# Patient Record
Sex: FEMALE | NOT HISPANIC OR LATINO | URBAN - METROPOLITAN AREA
[De-identification: names, ages, dates, MRNs, and addresses within clinical notes are randomized per-mention and may not be internally consistent; named-entity substitution may affect disease eponyms.]

---

## 2018-07-06 RX ORDER — TRETINOIN 0.25 MG/G
CREAM TOPICAL AT BEDTIME
COMMUNITY

## 2018-07-06 RX ORDER — CLINDAMYCIN PHOSPHATE 11.9 MG/ML
SOLUTION TOPICAL 2 TIMES DAILY
COMMUNITY

## 2018-07-06 RX ORDER — MULTIPLE VITAMINS W/ MINERALS TAB 9MG-400MCG
1 TAB ORAL DAILY
COMMUNITY

## 2018-07-06 RX ORDER — CYCLOSPORINE 0.5 MG/ML
2 EMULSION OPHTHALMIC DAILY
COMMUNITY

## 2018-07-06 RX ORDER — CETIRIZINE HYDROCHLORIDE 10 MG/1
10 TABLET ORAL DAILY
COMMUNITY

## 2018-07-06 RX ORDER — BACLOFEN 10 MG/1
5-10 TABLET ORAL
COMMUNITY

## 2018-07-09 ENCOUNTER — HOSPITAL ENCOUNTER (OUTPATIENT)
Facility: CLINIC | Age: 53
Discharge: HOME | End: 2018-07-09
Attending: ORTHOPAEDIC SURGERY | Admitting: ORTHOPAEDIC SURGERY
Payer: COMMERCIAL

## 2018-07-09 ENCOUNTER — ANESTHESIA (OUTPATIENT)
Dept: SURGERY | Facility: CLINIC | Age: 53
End: 2018-07-09
Payer: COMMERCIAL

## 2018-07-09 ENCOUNTER — ANESTHESIA EVENT (OUTPATIENT)
Dept: SURGERY | Facility: CLINIC | Age: 53
End: 2018-07-09
Payer: COMMERCIAL

## 2018-07-09 VITALS
DIASTOLIC BLOOD PRESSURE: 76 MMHG | RESPIRATION RATE: 11 BRPM | HEIGHT: 67 IN | TEMPERATURE: 97.2 F | WEIGHT: 145 LBS | SYSTOLIC BLOOD PRESSURE: 114 MMHG | BODY MASS INDEX: 22.76 KG/M2 | OXYGEN SATURATION: 96 %

## 2018-07-09 DIAGNOSIS — Z98.890 S/P FOOT SURGERY, LEFT: Primary | ICD-10-CM

## 2018-07-09 LAB — HCG UR QL: NEGATIVE

## 2018-07-09 PROCEDURE — 25000128 H RX IP 250 OP 636: Performed by: ANESTHESIOLOGY

## 2018-07-09 PROCEDURE — 25000128 H RX IP 250 OP 636: Performed by: PHYSICIAN ASSISTANT

## 2018-07-09 PROCEDURE — 25000566 ZZH SEVOFLURANE, EA 15 MIN: Performed by: ORTHOPAEDIC SURGERY

## 2018-07-09 PROCEDURE — 81025 URINE PREGNANCY TEST: CPT | Performed by: ANESTHESIOLOGY

## 2018-07-09 PROCEDURE — 25000125 ZZHC RX 250: Performed by: NURSE ANESTHETIST, CERTIFIED REGISTERED

## 2018-07-09 PROCEDURE — 36000060 ZZH SURGERY LEVEL 3 W FLUORO 1ST 30 MIN: Performed by: ORTHOPAEDIC SURGERY

## 2018-07-09 PROCEDURE — 25000132 ZZH RX MED GY IP 250 OP 250 PS 637: Performed by: PHYSICIAN ASSISTANT

## 2018-07-09 PROCEDURE — C1763 CONN TISS, NON-HUMAN: HCPCS | Performed by: ORTHOPAEDIC SURGERY

## 2018-07-09 PROCEDURE — 27110028 ZZH OR GENERAL SUPPLY NON-STERILE: Performed by: ORTHOPAEDIC SURGERY

## 2018-07-09 PROCEDURE — 71000012 ZZH RECOVERY PHASE 1 LEVEL 1 FIRST HR: Performed by: ORTHOPAEDIC SURGERY

## 2018-07-09 PROCEDURE — 27210995 ZZH RX 272: Performed by: ORTHOPAEDIC SURGERY

## 2018-07-09 PROCEDURE — 36000058 ZZH SURGERY LEVEL 3 EA 15 ADDTL MIN: Performed by: ORTHOPAEDIC SURGERY

## 2018-07-09 PROCEDURE — 40000170 ZZH STATISTIC PRE-PROCEDURE ASSESSMENT II: Performed by: ORTHOPAEDIC SURGERY

## 2018-07-09 PROCEDURE — 25000128 H RX IP 250 OP 636: Performed by: ORTHOPAEDIC SURGERY

## 2018-07-09 PROCEDURE — 71000027 ZZH RECOVERY PHASE 2 EACH 15 MINS: Performed by: ORTHOPAEDIC SURGERY

## 2018-07-09 PROCEDURE — 37000009 ZZH ANESTHESIA TECHNICAL FEE, EACH ADDTL 15 MIN: Performed by: ORTHOPAEDIC SURGERY

## 2018-07-09 PROCEDURE — 37000008 ZZH ANESTHESIA TECHNICAL FEE, 1ST 30 MIN: Performed by: ORTHOPAEDIC SURGERY

## 2018-07-09 PROCEDURE — 71000013 ZZH RECOVERY PHASE 1 LEVEL 1 EA ADDTL HR: Performed by: ORTHOPAEDIC SURGERY

## 2018-07-09 PROCEDURE — 27210794 ZZH OR GENERAL SUPPLY STERILE: Performed by: ORTHOPAEDIC SURGERY

## 2018-07-09 PROCEDURE — 25000128 H RX IP 250 OP 636: Performed by: NURSE ANESTHETIST, CERTIFIED REGISTERED

## 2018-07-09 DEVICE — IMP WIRE KIRSCHNER 0.054X4" 78.2040: Type: IMPLANTABLE DEVICE | Site: FOOT | Status: FUNCTIONAL

## 2018-07-09 DEVICE — GRAFT TENDON SEMITENDINOSUS 26CM 430355: Type: IMPLANTABLE DEVICE | Site: FOOT | Status: FUNCTIONAL

## 2018-07-09 RX ORDER — CEFAZOLIN SODIUM 1 G/3ML
1 INJECTION, POWDER, FOR SOLUTION INTRAMUSCULAR; INTRAVENOUS SEE ADMIN INSTRUCTIONS
Status: DISCONTINUED | OUTPATIENT
Start: 2018-07-09 | End: 2018-07-09

## 2018-07-09 RX ORDER — CEFAZOLIN SODIUM 2 G/100ML
2 INJECTION, SOLUTION INTRAVENOUS
Status: COMPLETED | OUTPATIENT
Start: 2018-07-09 | End: 2018-07-09

## 2018-07-09 RX ORDER — AMOXICILLIN 250 MG
1-2 CAPSULE ORAL 2 TIMES DAILY
Qty: 30 TABLET | Refills: 0 | Status: SHIPPED | OUTPATIENT
Start: 2018-07-09

## 2018-07-09 RX ORDER — FENTANYL CITRATE 50 UG/ML
INJECTION, SOLUTION INTRAMUSCULAR; INTRAVENOUS PRN
Status: DISCONTINUED | OUTPATIENT
Start: 2018-07-09 | End: 2018-07-09

## 2018-07-09 RX ORDER — HYDROCODONE BITARTRATE AND ACETAMINOPHEN 5; 325 MG/1; MG/1
1-2 TABLET ORAL EVERY 4 HOURS PRN
Qty: 30 TABLET | Refills: 0 | Status: SHIPPED | OUTPATIENT
Start: 2018-07-09

## 2018-07-09 RX ORDER — FENTANYL CITRATE 50 UG/ML
25-50 INJECTION, SOLUTION INTRAMUSCULAR; INTRAVENOUS
Status: DISCONTINUED | OUTPATIENT
Start: 2018-07-09 | End: 2018-07-09 | Stop reason: HOSPADM

## 2018-07-09 RX ORDER — HYDRALAZINE HYDROCHLORIDE 20 MG/ML
2.5-5 INJECTION INTRAMUSCULAR; INTRAVENOUS EVERY 10 MIN PRN
Status: DISCONTINUED | OUTPATIENT
Start: 2018-07-09 | End: 2018-07-09 | Stop reason: HOSPADM

## 2018-07-09 RX ORDER — ONDANSETRON 4 MG/1
4 TABLET, ORALLY DISINTEGRATING ORAL
Status: DISCONTINUED | OUTPATIENT
Start: 2018-07-09 | End: 2018-07-09 | Stop reason: HOSPADM

## 2018-07-09 RX ORDER — ONDANSETRON 2 MG/ML
INJECTION INTRAMUSCULAR; INTRAVENOUS PRN
Status: DISCONTINUED | OUTPATIENT
Start: 2018-07-09 | End: 2018-07-09

## 2018-07-09 RX ORDER — ONDANSETRON 2 MG/ML
4 INJECTION INTRAMUSCULAR; INTRAVENOUS EVERY 30 MIN PRN
Status: DISCONTINUED | OUTPATIENT
Start: 2018-07-09 | End: 2018-07-09 | Stop reason: HOSPADM

## 2018-07-09 RX ORDER — HYDROMORPHONE HYDROCHLORIDE 1 MG/ML
.3-.5 INJECTION, SOLUTION INTRAMUSCULAR; INTRAVENOUS; SUBCUTANEOUS EVERY 10 MIN PRN
Status: DISCONTINUED | OUTPATIENT
Start: 2018-07-09 | End: 2018-07-09 | Stop reason: HOSPADM

## 2018-07-09 RX ORDER — MEPERIDINE HYDROCHLORIDE 25 MG/ML
12.5 INJECTION INTRAMUSCULAR; INTRAVENOUS; SUBCUTANEOUS
Status: DISCONTINUED | OUTPATIENT
Start: 2018-07-09 | End: 2018-07-09 | Stop reason: HOSPADM

## 2018-07-09 RX ORDER — LIDOCAINE HYDROCHLORIDE 20 MG/ML
INJECTION, SOLUTION INFILTRATION; PERINEURAL PRN
Status: DISCONTINUED | OUTPATIENT
Start: 2018-07-09 | End: 2018-07-09

## 2018-07-09 RX ORDER — SODIUM CHLORIDE, SODIUM LACTATE, POTASSIUM CHLORIDE, CALCIUM CHLORIDE 600; 310; 30; 20 MG/100ML; MG/100ML; MG/100ML; MG/100ML
INJECTION, SOLUTION INTRAVENOUS CONTINUOUS PRN
Status: DISCONTINUED | OUTPATIENT
Start: 2018-07-09 | End: 2018-07-09

## 2018-07-09 RX ORDER — HYDROCODONE BITARTRATE AND ACETAMINOPHEN 5; 325 MG/1; MG/1
1 TABLET ORAL
Status: COMPLETED | OUTPATIENT
Start: 2018-07-09 | End: 2018-07-09

## 2018-07-09 RX ORDER — ONDANSETRON 4 MG/1
4 TABLET, ORALLY DISINTEGRATING ORAL EVERY 30 MIN PRN
Status: DISCONTINUED | OUTPATIENT
Start: 2018-07-09 | End: 2018-07-09 | Stop reason: HOSPADM

## 2018-07-09 RX ORDER — ONDANSETRON 4 MG/1
4-8 TABLET, ORALLY DISINTEGRATING ORAL EVERY 8 HOURS PRN
Qty: 12 TABLET | Refills: 1 | Status: SHIPPED | OUTPATIENT
Start: 2018-07-09

## 2018-07-09 RX ORDER — ROPIVACAINE HYDROCHLORIDE 5 MG/ML
INJECTION, SOLUTION EPIDURAL; INFILTRATION; PERINEURAL PRN
Status: DISCONTINUED | OUTPATIENT
Start: 2018-07-09 | End: 2018-07-09 | Stop reason: HOSPADM

## 2018-07-09 RX ORDER — NALOXONE HYDROCHLORIDE 0.4 MG/ML
.1-.4 INJECTION, SOLUTION INTRAMUSCULAR; INTRAVENOUS; SUBCUTANEOUS
Status: DISCONTINUED | OUTPATIENT
Start: 2018-07-09 | End: 2018-07-09 | Stop reason: HOSPADM

## 2018-07-09 RX ORDER — ALBUTEROL SULFATE 0.83 MG/ML
2.5 SOLUTION RESPIRATORY (INHALATION) EVERY 4 HOURS PRN
Status: DISCONTINUED | OUTPATIENT
Start: 2018-07-09 | End: 2018-07-09 | Stop reason: HOSPADM

## 2018-07-09 RX ORDER — CEFAZOLIN SODIUM 2 G/100ML
2 INJECTION, SOLUTION INTRAVENOUS
Status: DISCONTINUED | OUTPATIENT
Start: 2018-07-09 | End: 2018-07-09 | Stop reason: HOSPADM

## 2018-07-09 RX ORDER — DEXAMETHASONE SODIUM PHOSPHATE 4 MG/ML
INJECTION, SOLUTION INTRA-ARTICULAR; INTRALESIONAL; INTRAMUSCULAR; INTRAVENOUS; SOFT TISSUE PRN
Status: DISCONTINUED | OUTPATIENT
Start: 2018-07-09 | End: 2018-07-09

## 2018-07-09 RX ORDER — PROPOFOL 10 MG/ML
INJECTION, EMULSION INTRAVENOUS PRN
Status: DISCONTINUED | OUTPATIENT
Start: 2018-07-09 | End: 2018-07-09

## 2018-07-09 RX ORDER — PROPOFOL 10 MG/ML
INJECTION, EMULSION INTRAVENOUS CONTINUOUS PRN
Status: DISCONTINUED | OUTPATIENT
Start: 2018-07-09 | End: 2018-07-09

## 2018-07-09 RX ORDER — MAGNESIUM HYDROXIDE 1200 MG/15ML
LIQUID ORAL PRN
Status: DISCONTINUED | OUTPATIENT
Start: 2018-07-09 | End: 2018-07-09 | Stop reason: HOSPADM

## 2018-07-09 RX ORDER — SODIUM CHLORIDE, SODIUM LACTATE, POTASSIUM CHLORIDE, CALCIUM CHLORIDE 600; 310; 30; 20 MG/100ML; MG/100ML; MG/100ML; MG/100ML
INJECTION, SOLUTION INTRAVENOUS CONTINUOUS
Status: DISCONTINUED | OUTPATIENT
Start: 2018-07-09 | End: 2018-07-09 | Stop reason: HOSPADM

## 2018-07-09 RX ADMIN — PROPOFOL 150 MG: 10 INJECTION, EMULSION INTRAVENOUS at 12:04

## 2018-07-09 RX ADMIN — LIDOCAINE HYDROCHLORIDE 80 MG: 20 INJECTION, SOLUTION INFILTRATION; PERINEURAL at 12:04

## 2018-07-09 RX ADMIN — MIDAZOLAM 2 MG: 1 INJECTION INTRAMUSCULAR; INTRAVENOUS at 12:04

## 2018-07-09 RX ADMIN — SODIUM CHLORIDE, POTASSIUM CHLORIDE, SODIUM LACTATE AND CALCIUM CHLORIDE: 600; 310; 30; 20 INJECTION, SOLUTION INTRAVENOUS at 11:59

## 2018-07-09 RX ADMIN — PROPOFOL 100 MCG/KG/MIN: 10 INJECTION, EMULSION INTRAVENOUS at 12:04

## 2018-07-09 RX ADMIN — FENTANYL CITRATE 50 MCG: 50 INJECTION INTRAMUSCULAR; INTRAVENOUS at 13:18

## 2018-07-09 RX ADMIN — FENTANYL CITRATE 50 MCG: 50 INJECTION, SOLUTION INTRAMUSCULAR; INTRAVENOUS at 12:15

## 2018-07-09 RX ADMIN — DEXAMETHASONE SODIUM PHOSPHATE 4 MG: 4 INJECTION, SOLUTION INTRA-ARTICULAR; INTRALESIONAL; INTRAMUSCULAR; INTRAVENOUS; SOFT TISSUE at 12:10

## 2018-07-09 RX ADMIN — ONDANSETRON 4 MG: 2 INJECTION INTRAMUSCULAR; INTRAVENOUS at 12:39

## 2018-07-09 RX ADMIN — HYDROCODONE BITARTRATE AND ACETAMINOPHEN 1 TABLET: 5; 325 TABLET ORAL at 15:06

## 2018-07-09 RX ADMIN — CEFAZOLIN SODIUM 2 G: 2 INJECTION, SOLUTION INTRAVENOUS at 12:06

## 2018-07-09 RX ADMIN — FENTANYL CITRATE 50 MCG: 50 INJECTION, SOLUTION INTRAMUSCULAR; INTRAVENOUS at 12:04

## 2018-07-09 RX ADMIN — FENTANYL CITRATE 50 MCG: 50 INJECTION INTRAMUSCULAR; INTRAVENOUS at 13:13

## 2018-07-09 NOTE — IP AVS SNAPSHOT
MRN:[id number]                      After Visit Summary   7/9/2018    Anne Gómez    MRN: [id number]           Thank you!     Thank you for choosing [facility] for your care. Our goal is always to provide you with excellent care. Hearing back from our patients is one way we can continue to improve our services. Please take a few minutes to complete the written survey that you may receive in the mail after you visit with us. Thank you!        Patient Information     Date Of Birth          1965        About your hospital stay     You were admitted on:  July 9, 2018 [facility]    You were discharged on:  July 9, 2018       Who to Call     For medical emergencies, please call 911.  For non-urgent questions about your medical care, please call your primary care provider or clinic, [phone]  For questions related to your surgery, please call your surgery clinic        Attending Provider     Provider Basia العلي MD Orthopaedic Surgery       Primary Care Provider Office Phone # Fax #    Arianne Wren MD, [phone]      After Care Instructions     Activity       Ambulate with assistance until independent            Discharge Instructions       Review discharge instructions as directed by Provider.            Discharge Instructions       Patient to arrange for return to clinic appointment in two weeks.            Elevate affected extremity           Ice to affected area       Ice pack to affected area PRN (as needed).            No dressing change       until follow up clinic appointment.            No driving or operating machinery       until the day after procedure            Weight bearing status - As tolerated                 Further instructions from your care team         Same Day Surgery Discharge Instructions for  Sedation and General Anesthesia       It's not unusual to feel  dizzy, light-headed or faint for up to 24 hours after surgery or while taking pain medication.  If you have these symptoms: sit for a few minutes before standing and have someone assist you when you get up to walk or use the bathroom.      You should rest and relax for the next 24 hours. We recommend you make arrangements to have an adult stay with you for at least 24 hours after your discharge.  Avoid hazardous and strenuous activity.      DO NOT DRIVE any vehicle or operate mechanical equipment for 24 hours following the end of your surgery.  Even though you may feel normal, your reactions may be affected by the medication you have received.      Do not drink alcoholic beverages for 24 hours following surgery.       Slowly progress to your regular diet as you feel able. It's not unusual to feel nauseated and/or vomit after receiving anesthesia.  If you develop these symptoms, drink clear liquids (apple juice, ginger ale, broth, 7-up, etc. ) until you feel better.  If your nausea and vomiting persists for 24 hours, please notify your surgeon.        All narcotic pain medications, along with inactivity and anesthesia, can cause constipation. Drinking plenty of liquids and increasing fiber intake will help.      For any questions of a medical nature, call your surgeon.      Do not make important decisions for 24 hours.      If you had general anesthesia, you may have a sore throat for a couple of days related to the breathing tube used during surgery.  You may use Cepacol lozenges to help with this discomfort.  If it worsens or if you develop a fever, contact your surgeon.       If you feel your pain is not well managed with the pain medications prescribed by your surgeon, please contact your surgeon's office to let them know so they can address your concerns.     POST-OPERATIVE INSTRUCTIONS  FOOT AND ANKLE SURGERY  ROBERT Sepulveda M.D.  Edu Tijerina P.A.-C    These precautions MUST be followed for the first 24  hours after surgery:    Upon discharge, go directly home.    You must make arrangements to have a responsible adult stay with you.    DO NOT DRINK ALCOHOLIC BEVERAGES    It is not unusual to feel lightheaded up to 24 hours after surgery or while taking pain medication.  If you feel lightheaded, sit for a few minutes before standing and have someone assisted you when you get up to walk or use the restroom.    Do not use any mechanical equipment.    Do not make any important or legal decisions for 24 hours or while on pain medications.    You may experience dry mouth, sore throat, or sleep disturbances from the anesthesia and medications used during surgery.  Generalized muscle aches can sometimes occur.  These usually disappear in 12-24 hours.    POST-OPERATIVE CARE GUIDELINES    The following are general guidelines about what to expect the first days/weeks after surgery.  They are not specific, and your recovery may be slightly different.    Blood clots are not common, but are emergencies.  If you have sudden onset pain in your calf or leg, or have sudden shortness of breath, go to the Emergency Department.      Elevation of your operative foot/ankle is key to reducing the swelling in the immediate post-operative phase (first 3-5 days).  When you are at rest, elevate your foot at or above the level of your heart.  When sitting, your foot should be elevated on a chair or stool; not hanging down.      You should plan to rest the day after surgery no matter how minor the procedure.  More complicated procedures will require more time to return to normal activity.      Foot and ankle surgery is painful in most cases - use your medication as directed for the first 24 hours after surgery.  It is not unusual for the pain to be worse a day or two after surgery than on the day of.  If your pain is more than 8/10 contact our office.  If you don t have pain, gradually decrease your pain meds by substituting Tylenol.  Please  don t use Advil/ibuprofen unless we order it for you.      You will be prescribed Percocet or Vicodin for pain, Vistaril for spasms/pain adjunct, Senokot for constipation.  If you have had trouble taking these meds before or experience nausea or vomiting after surgery from your medication, please advise the recovery room nurses.  If you are already at home, try drinking only clear liquids and/or call our office.      All pain medications, along with inactivity can cause constipation.  Use the Senakot as directed, increase fluid intake to 1 quart per day and increase your dietary fiber.  (The  P  fruits - peaches, plums, pears, and prunes as well as anise/black licorice are recommended.)    It is not unusual to run a low-grade fever after surgery.  If your temperature is elevated above 102 , lasts longer than 24 hours, or is questionable in any way, contact our office.      Drainage from your cast/dressing is normal.  Reinforce your cast/dressing with 4x4 dressings and cover with an Ace wrap.  Wait until 24 hours after surgery to change your dressings; by this time most of your bleeding will have stopped.  If you have drainage through your dressings 2 days after surgery, contact our office.      You cast/dressing will be changed at your 2-week follow up appointment.  They should be kept clean and DRY.  If your cast/dressing is damaged before that, contact our office.      It is normal to experience some bruising in the toes after surgery and they may appear  dark  when your foot hangs down.  It is important to actively wiggle your toes for at least 5-10 minutes each hour UNLESS you had surgery on those toes.      Use ice on your foot/ankle over the dressing/cast for 20 minutes per hour, 10 or more times per day.  A large bag of frozen peas works well.      Bathing: take a tub or sponge bath instead of a shower if possible during the first two weeks.  If you choose to shower, cover the dressing/cast with a waterproof  covering, these may be ordered from www.seal-tight.com or are available at some pharmacies/medical supply stores.  Another option is XeroSox, which is the original vacuum sealed bandage and cast cover.  The cast cover has a vacuum seal and is absolutely waterproof.  [phone] or www.xerosox.[employer] for more information.      Driving:  For surgery on the left foot/ankle, you may drive as soon as narcotic medications are stopped.  For surgery on the right foot/ankle, you may drive when you are out of a cast, off pain medications, and you feel secure with braking.      In general, listen to your foot/ankle.  If you have a sudden, dramatic increase in pain that does not resolve after an hour or so, something is wrong - call our office.  If you fall or bump your foot/ankle and have sudden pain that resolves, give it 24 hours before you call.      Many of your questions can be addressed at your 2-week follow-up appointment - please make a list of things to ask us as they come up during your recovery.      If you had a nerve block it is common to have numbness in your leg and foot for up to 30 hours after surgery.  If your leg or foot is still numb more than 30 hours after surgery, please call the office.      FUTURE DENTIST VISITS:  If you have had a total ankle arthroplasty please be advised you must be on antibiotics prior to ANY dental work.  Please call your dentist office ahead of time and make them aware of this as your dentist will be able to order the prescription.  If you have had any other surgery this is not a concern.    If you have any further questions or concerns, please call our office at [phone]    **If you have questions or concerns about your procedure,   call Dr. Sepulveda at [phone]**                  Pending Results     No orders found from 7/7/2018 to 7/10/2018.            Statement of Approval     Ordered          07/09/18 7282  I have reviewed and agree with all the recommendations and  "orders detailed in this document.  EFFECTIVE NOW     Approved and electronically signed by:  Ricardo Tijerina PA-C             Admission Information     Date & Time Provider Department Dept. Phone    2018 Basia Sepulveda MD [facility] Same Day Surgery [phone]      Your Vitals Were     Blood Pressure Temperature Respirations Height Weight Pulse Oximetry    120/67 97.2  F (36.2  C) 14 1.702 m (5' 7\") 65.8 kg (145 lb) 94%    BMI (Body Mass Index)                   22.71 kg/m2           MyChart Information     [email or web address] lets you send messages to your doctor, view your test results, renew your prescriptions, schedule appointments and more. To sign up, go to [email or web address] . Click on \"Log in\" on the left side of the screen, which will take you to the Welcome page. Then click on \"Sign up Now\" on the right side of the page.     You will be asked to enter the access code listed below, as well as some personal information. Please follow the directions to create your username and password.     Your access code is: [email or web address]  Expires: 10/7/2018 12:40 PM     Your access code will  in 90 days. If you need help or a new code, please call your [facility] clinic or [phone].        Care EveryWhere ID     This is your Care EveryWhere ID. This could be used by other organizations to access your [facility] medical records  [id number]        Equal Access to Services     [facility] AH: Hadii cherelle ku hadasho Soomaali, waaxda luqadaha, qaybta kaalmada adeegyada, jasmine ghotra . So [facility] [phone].    ATENCIÓN: Si habla español, tiene a faith disposición servicios gratuitos de asistencia lingüística. Llame al [phone].    We comply with applicable federal civil rights laws and [place] laws. We do not discriminate on the basis of race, color, national origin, age, disability, sex, sexual orientation, or gender identity.               Review of your medicines    "   START taking        Dose / Directions    HYDROcodone-acetaminophen 5-325 MG per tablet   Commonly known as:  NORCO   Used for:  S/P foot surgery, left   Notes to Patient:  One tablet given at 3:06 P.M.        Dose:  1-2 tablet   Take 1-2 tablets by mouth every 4 hours as needed for other (Moderate to Severe Pain)   Quantity:  30 tablet   Refills:  0       ondansetron 4 MG ODT tab   Commonly known as:  ZOFRAN-ODT   Used for:  S/P foot surgery, left        Dose:  4-8 mg   Take 1-2 tablets (4-8 mg) by mouth every 8 hours as needed for nausea Dissolve ON the tongue.   Quantity:  12 tablet   Refills:  1       senna-docusate 8.6-50 MG per tablet   Commonly known as:  SENOKOT-S;PERICOLACE   Used for:  S/P foot surgery, left        Dose:  1-2 tablet   Take 1-2 tablets by mouth 2 times daily Take while on oral narcotics to prevent or treat constipation.   Quantity:  30 tablet   Refills:  0         CONTINUE these medicines which have NOT CHANGED        Dose / Directions    baclofen 10 MG tablet   Commonly known as:  LIORESAL        Dose:  5-10 mg   Take 5-10 mg by mouth nightly as needed for muscle spasms   Refills:  0       BACTRIM PO        Dose:  160 mg   Take 160 mg by mouth   Refills:  0       CALCIUM PO        Refills:  0       cetirizine 10 MG tablet   Commonly known as:  zyrTEC        Dose:  10 mg   Take 10 mg by mouth daily   Refills:  0       clindamycin 1 % solution   Commonly known as:  CLEOCIN T        Apply topically 2 times daily   Refills:  0       cycloSPORINE 0.05 % ophthalmic emulsion   Commonly known as:  RESTASIS        Dose:  2 drop   2 drops daily   Refills:  0       FROVA PO        Dose:  2.5 mg   Take 2.5 mg by mouth once as needed for migraine   Refills:  0       LIPITOR PO        Dose:  20 mg   Take 20 mg by mouth daily   Refills:  0       Multi-vitamin Tabs tablet        Dose:  1 tablet   Take 1 tablet by mouth daily   Refills:  0       PRILOSEC PO        Dose:  20 mg   Take 20 mg by mouth every  morning   Refills:  0       ROBAXIN PO        Dose:  500 mg   Take 500 mg by mouth 4 times daily   Refills:  0       tretinoin 0.025 % cream   Commonly known as:  RETIN-A        Apply topically At Bedtime   Refills:  0       TYLENOL PO        Dose:  1000 mg   Take 1,000 mg by mouth every 6 hours as needed for mild pain or fever   Refills:  0       VITAMIN C PO        Dose:  500 mg   Take 500 mg by mouth   Refills:  0       VITAMIN D (CHOLECALCIFEROL) PO        Dose:  2000 Units   Take 2,000 Units by mouth daily   Refills:  0            Where to get your medicines      These medications were sent to [facility] [place], [place] - [address]  [address], [place] [address]     Phone:  [phone]     ondansetron 4 MG ODT tab    senna-docusate 8.6-50 MG per tablet         Some of these will need a paper prescription and others can be bought over the counter. Ask your nurse if you have questions.     Bring a paper prescription for each of these medications     HYDROcodone-acetaminophen 5-325 MG per tablet                Protect others around you: Learn how to safely use, store and throw away your medicines at www.disposemymeds.org.        ANTIBIOTIC INSTRUCTION     You've Been Prescribed an Antibiotic - Now What?  Your healthcare team thinks that you or your loved one might have an infection. Some infections can be treated with antibiotics, which are powerful, life-saving drugs. Like all medications, antibiotics have side effects and should only be used when necessary. There are some important things you should know about your antibiotic treatment.      Your healthcare team may run tests before you start taking an antibiotic.    Your team may take samples (e.g., from your blood, urine or other areas) to run tests to look for bacteria. These test can be important to determine if you need an antibiotic at all and, if you do, which antibiotic will work best.      Within a few days, your  healthcare team might change or even stop your antibiotic.    Your team may start you on an antibiotic while they are working to find out what is making you sick.    Your team might change your antibiotic because test results show that a different antibiotic would be better to treat your infection.    In some cases, once your team has more information, they learn that you do not need an antibiotic at all. They may find out that you don't have an infection, or that the antibiotic you're taking won't work against your infection. For example, an infection caused by a virus can't be treated with antibiotics. Staying on an antibiotic when you don't need it is more likely to be harmful than helpful.      You may experience side effects from your antibiotic.    Like all medications, antibiotics have side effects. Some of these can be serious.    Let you healthcare team know if you have any known allergies when you are admitted to the hospital.    One significant side effect of nearly all antibiotics is the risk of severe and sometimes deadly diarrhea caused by Clostridium difficile (C. Difficile). This occurs when a person takes antibiotics because some good germs are destroyed. Antibiotic use allows C. diificile to take over, putting patients at high risk for this serious infection.    As a patient or caregiver, it is important to understand your or your loved one's antibiotic treatment. It is especially important for caregivers to speak up when patients can't speak for themselves. Here are some important questions to ask your healthcare team.    What infection is this antibiotic treating and how do you know I have that infection?    What side effects might occur from this antibiotic?    How long will I need to take this antibiotic?    Is it safe to take this antibiotic with other medications or supplements (e.g., vitamins) that I am taking?     Are there any special directions I need to know about taking this antibiotic?  For example, should I take it with food?    How will I be monitored to know whether my infection is responding to the antibiotic?    What tests may help to make sure the right antibiotic is prescribed for me?      Information provided by:  www.cdc.gov/getsmart  U.S. Department of Health and Human Services  Centers for disease Control and Prevention  National Center for Emerging and Zoonotic Infectious Diseases  Division of Healthcare Quality Promotion        Information about OPIOIDS     PRESCRIPTION OPIOIDS: WHAT YOU NEED TO KNOW   We gave you an opioid (narcotic) pain medicine. It is important to manage your pain, but opioids are not always the best choice. You should first try all the other options your care team gave you. Take this medicine for as short a time (and as few doses) as possible.     These medicines have risks:    DO NOT drive when on new or higher doses of pain medicine. These medicines can affect your alertness and reaction times, and you could be arrested for driving under the influence (DUI). If you need to use opioids long-term, talk to your care team about driving.    DO NOT operate heave machinery    DO NOT do any other dangerous activities while taking these medicines.     DO NOT drink any alcohol while taking these medicines.      If the opioid prescribed includes acetaminophen, DO NOT take with any other medicines that contain acetaminophen. Read all labels carefully. Look for the word  acetaminophen  or  Tylenol.  Ask your pharmacist if you have questions or are unsure.    You can get addicted to pain medicines, especially if you have a history of addiction (chemical, alcohol or substance dependence). Talk to your care team about ways to reduce this risk.    Store your pills in a secure place, locked if possible. We will not replace any lost or stolen medicine. If you don t finish your medicine, please throw away (dispose) as directed by your pharmacist. The [place] [employer] has more information about safe disposal: https://www.[facility].[place].us/living-green/managing-unwanted-medications.     All opioids tend to cause constipation. Drink plenty of water and eat foods that have a lot of fiber, such as fruits, vegetables, prune juice, apple juice and high-fiber cereal. Take a laxative (Miralax, milk of magnesia, Colace, Senna) if you don t move your bowels at least every other day.              Medication List: This is a list of all your medications and when to take them. Check marks below indicate your daily home schedule. Keep this list as a reference.      Medications           Morning Afternoon Evening Bedtime As Needed    baclofen 10 MG tablet   Commonly known as:  LIORESAL   Take 5-10 mg by mouth nightly as needed for muscle spasms                                BACTRIM PO   Take 160 mg by mouth                                CALCIUM PO                                cetirizine 10 MG tablet   Commonly known as:  zyrTEC   Take 10 mg by mouth daily                                clindamycin 1 % solution   Commonly known as:  CLEOCIN T   Apply topically 2 times daily                                cycloSPORINE 0.05 % ophthalmic emulsion   Commonly known as:  RESTASIS   2 drops daily                                FROVA PO   Take 2.5 mg by mouth once as needed for migraine                                HYDROcodone-acetaminophen 5-325 MG per tablet   Commonly known as:  NORCO   Take 1-2 tablets by mouth every 4 hours as needed for other (Moderate to Severe Pain)   Last time this was given:  1 tablet on 7/9/2018  3:06 PM   Notes to Patient:  One tablet given at 3:06 P.M.                                LIPITOR PO   Take 20 mg by mouth daily                                Multi-vitamin Tabs tablet   Take 1 tablet by mouth daily                                ondansetron 4 MG ODT tab   Commonly known as:  ZOFRAN-ODT   Take 1-2 tablets (4-8 mg) by mouth every 8 hours as needed for  nausea Dissolve ON the tongue.                                PRILOSEC PO   Take 20 mg by mouth every morning                                ROBAXIN PO   Take 500 mg by mouth 4 times daily                                senna-docusate 8.6-50 MG per tablet   Commonly known as:  SENOKOT-S;PERICOLACE   Take 1-2 tablets by mouth 2 times daily Take while on oral narcotics to prevent or treat constipation.                                tretinoin 0.025 % cream   Commonly known as:  RETIN-A   Apply topically At Bedtime                                TYLENOL PO   Take 1,000 mg by mouth every 6 hours as needed for mild pain or fever                                VITAMIN C PO   Take 500 mg by mouth                                VITAMIN D (CHOLECALCIFEROL) PO   Take 2,000 Units by mouth daily

## 2018-07-09 NOTE — ANESTHESIA PREPROCEDURE EVALUATION
Anesthesia Evaluation     . Pt has had prior anesthetic.     No history of anesthetic complications          ROS/MED HX    ENT/Pulmonary:      (-) sleep apnea   Neurologic:     (+)migraines,     Cardiovascular:     (+) Dyslipidemia, ----. : . . . :. .       METS/Exercise Tolerance:     Hematologic:         Musculoskeletal:         GI/Hepatic:     (+) GERD       Renal/Genitourinary:         Endo:         Psychiatric:         Infectious Disease:         Malignancy:         Other:                                    Anesthesia Plan      History & Physical Review  History and physical reviewed and following examination; no interval change.    ASA Status:  2 .    NPO Status:  > 8 hours    Plan for General and LMA with Intravenous induction. Maintenance will be Balanced.    PONV prophylaxis:  Ondansetron (or other 5HT-3) and Dexamethasone or Solumedrol  Propofol gtt      Postoperative Care  Postoperative pain management:  IV analgesics and Oral pain medications.      Consents  Anesthetic plan, risks, benefits and alternatives discussed with:  Patient..                      Procedure: Procedure(s):  ARTHRODESIS TOE(S)  Preop diagnosis: LEFT FOOT ARTHRITIS    Allergies   Allergen Reactions     Amoxicillin Hives     Erythromycin Itching     Monosodium Glutamate Other (See Comments)     ANGIOEDEMA     Phenacetin Hives     Doxycycline Rash     Past Medical History:   Diagnosis Date     Arthritis      Gastroesophageal reflux disease      Hyperlipidemia      ROBERT (minimal change disease)      Migraines      Presence of intrauterine contraceptive device      Rhinitis      Past Surgical History:   Procedure Laterality Date     APPENDECTOMY       GENITOURINARY SURGERY      WIDENING OF URETHRA     ORTHOPEDIC SURGERY Bilateral     bunionectomy     ORTHOPEDIC SURGERY      hammertoe     ORTHOPEDIC SURGERY      neuroma removal     ORTHOPEDIC SURGERY      hardware removal     SINUS SURGERY       TONSILLECTOMY       Social History    Substance Use Topics     Smoking status: Never Smoker     Smokeless tobacco: Never Used     Alcohol use Yes      Comment: OCCAS     Prior to Admission medications    Medication Sig Start Date End Date Taking? Authorizing Provider   Acetaminophen (TYLENOL PO) Take 1,000 mg by mouth every 6 hours as needed for mild pain or fever   Yes Reported, Patient   Ascorbic Acid (VITAMIN C PO) Take 500 mg by mouth   Yes Reported, Patient   Atorvastatin Calcium (LIPITOR PO) Take 20 mg by mouth daily   Yes Reported, Patient   baclofen (LIORESAL) 10 MG tablet Take 5-10 mg by mouth nightly as needed for muscle spasms   Yes Reported, Patient   CALCIUM PO    Yes Reported, Patient   cetirizine (ZYRTEC) 10 MG tablet Take 10 mg by mouth daily   Yes Reported, Patient   clindamycin (CLEOCIN T) 1 % solution Apply topically 2 times daily   Yes Reported, Patient   cycloSPORINE (RESTASIS) 0.05 % ophthalmic emulsion 2 drops daily   Yes Reported, Patient   Frovatriptan Succinate (FROVA PO) Take 2.5 mg by mouth once as needed for migraine   Yes Reported, Patient   Methocarbamol (ROBAXIN PO) Take 500 mg by mouth 4 times daily   Yes Reported, Patient   multivitamin, therapeutic with minerals (MULTI-VITAMIN) TABS tablet Take 1 tablet by mouth daily   Yes Reported, Patient   Omeprazole (PRILOSEC PO) Take 20 mg by mouth every morning   Yes Reported, Patient   tretinoin (RETIN-A) 0.025 % cream Apply topically At Bedtime   Yes Reported, Patient   VITAMIN D, CHOLECALCIFEROL, PO Take 2,000 Units by mouth daily   Yes Reported, Patient     Current Facility-Administered Medications Ordered in Epic   Medication Dose Route Frequency Last Rate Last Dose     ceFAZolin (ANCEF) 1 g vial to attach to  ml bag for ADULT or 50 ml bag for PEDS  1 g Intravenous See Admin Instructions         ceFAZolin (ANCEF) intermittent infusion 2 g in 100 mL dextrose PRE-MIX  2 g Intravenous Pre-Op/Pre-procedure x 1 dose         No current [facility]-ordered outpatient  prescriptions on file.       Wt Readings from Last 1 Encounters:   07/09/18 65.8 kg (145 lb)     Temp Readings from Last 1 Encounters:   07/09/18 36.9  C (98.4  F) (Oral)     BP Readings from Last 6 Encounters:   07/09/18 138/74   05/13/08 90/66     Pulse Readings from Last 4 Encounters:   05/13/08 87     Resp Readings from Last 1 Encounters:   07/09/18 16     SpO2 Readings from Last 1 Encounters:   07/09/18 99%     No results for input(s): NA, POTASSIUM, CHLORIDE, CO2, ANIONGAP, GLC, BUN, CR, SHAKIRA in the last 34048 hours.  No results for input(s): AST, ALT, ALKPHOS, BILITOTAL, LIPASE in the last 16524 hours.  No results for input(s): WBC, HGB, PLT in the last 97972 hours.  No results for input(s): ABO, RH in the last 97504 hours.  No results for input(s): INR, PTT in the last 33661 hours.   No results for input(s): TROPI in the last 48887 hours.  No results for input(s): PH, PCO2, PO2, HCO3 in the last 11812 hours.  No results for input(s): HCG in the last 61749 hours.  No results found for this or any previous visit (from the past 744 hour(s)).    RECENT LABS:   ECG:   ECHO:

## 2018-07-09 NOTE — ANESTHESIA CARE TRANSFER NOTE
Patient: Anne Gómez    Procedure(s):  LEFT GREAT TOE INTERPHALANGEAL JOINT FUSION AND METATARSOPHALANGEAL INTERPOSITION GRAFT - Wound Class: I-Clean    Diagnosis: LEFT FOOT ARTHRITIS  Diagnosis Additional Information: No value filed.    Anesthesia Type:   General, LMA     Note:  Airway :Face Mask  Patient transferred to:PACU  Handoff Report: Identifed the Patient, Identified the Reponsible Provider, Reviewed the pertinent medical history, Discussed the surgical course, Reviewed Intra-OP anesthesia mangement and issues during anesthesia, Set expectations for post-procedure period and Allowed opportunity for questions and acknowledgement of understanding      Vitals: (Last set prior to Anesthesia Care Transfer)    CRNA VITALS  7/9/2018 1226 - 7/9/2018 1301      7/9/2018             Pulse: 66    SpO2: 96 %    Resp Rate (set): 10                Electronically Signed By: FAUZIA Barajas CRNA  July 9, 2018  1:01 PM

## 2018-07-09 NOTE — DISCHARGE INSTRUCTIONS
Same Day Surgery Discharge Instructions for  Sedation and General Anesthesia       It's not unusual to feel dizzy, light-headed or faint for up to 24 hours after surgery or while taking pain medication.  If you have these symptoms: sit for a few minutes before standing and have someone assist you when you get up to walk or use the bathroom.      You should rest and relax for the next 24 hours. We recommend you make arrangements to have an adult stay with you for at least 24 hours after your discharge.  Avoid hazardous and strenuous activity.      DO NOT DRIVE any vehicle or operate mechanical equipment for 24 hours following the end of your surgery.  Even though you may feel normal, your reactions may be affected by the medication you have received.      Do not drink alcoholic beverages for 24 hours following surgery.       Slowly progress to your regular diet as you feel able. It's not unusual to feel nauseated and/or vomit after receiving anesthesia.  If you develop these symptoms, drink clear liquids (apple juice, ginger ale, broth, 7-up, etc. ) until you feel better.  If your nausea and vomiting persists for 24 hours, please notify your surgeon.        All narcotic pain medications, along with inactivity and anesthesia, can cause constipation. Drinking plenty of liquids and increasing fiber intake will help.      For any questions of a medical nature, call your surgeon.      Do not make important decisions for 24 hours.      If you had general anesthesia, you may have a sore throat for a couple of days related to the breathing tube used during surgery.  You may use Cepacol lozenges to help with this discomfort.  If it worsens or if you develop a fever, contact your surgeon.       If you feel your pain is not well managed with the pain medications prescribed by your surgeon, please contact your surgeon's office to let them know so they can address your concerns.     POST-OPERATIVE INSTRUCTIONS  FOOT AND ANKLE  SURGERY  ROBERT Sepulveda M.D.  Edu Tijerina P.A.-C    These precautions MUST be followed for the first 24 hours after surgery:    Upon discharge, go directly home.    You must make arrangements to have a responsible adult stay with you.    DO NOT DRINK ALCOHOLIC BEVERAGES    It is not unusual to feel lightheaded up to 24 hours after surgery or while taking pain medication.  If you feel lightheaded, sit for a few minutes before standing and have someone assisted you when you get up to walk or use the restroom.    Do not use any mechanical equipment.    Do not make any important or legal decisions for 24 hours or while on pain medications.    You may experience dry mouth, sore throat, or sleep disturbances from the anesthesia and medications used during surgery.  Generalized muscle aches can sometimes occur.  These usually disappear in 12-24 hours.    POST-OPERATIVE CARE GUIDELINES    The following are general guidelines about what to expect the first days/weeks after surgery.  They are not specific, and your recovery may be slightly different.    Blood clots are not common, but are emergencies.  If you have sudden onset pain in your calf or leg, or have sudden shortness of breath, go to the Emergency Department.      Elevation of your operative foot/ankle is key to reducing the swelling in the immediate post-operative phase (first 3-5 days).  When you are at rest, elevate your foot at or above the level of your heart.  When sitting, your foot should be elevated on a chair or stool; not hanging down.      You should plan to rest the day after surgery no matter how minor the procedure.  More complicated procedures will require more time to return to normal activity.      Foot and ankle surgery is painful in most cases - use your medication as directed for the first 24 hours after surgery.  It is not unusual for the pain to be worse a day or two after surgery than on the day of.  If your pain is more than 8/10  contact our office.  If you don t have pain, gradually decrease your pain meds by substituting Tylenol.  Please don t use Advil/ibuprofen unless we order it for you.      You will be prescribed Percocet or Vicodin for pain, Vistaril for spasms/pain adjunct, Senokot for constipation.  If you have had trouble taking these meds before or experience nausea or vomiting after surgery from your medication, please advise the recovery room nurses.  If you are already at home, try drinking only clear liquids and/or call our office.      All pain medications, along with inactivity can cause constipation.  Use the Senakot as directed, increase fluid intake to 1 quart per day and increase your dietary fiber.  (The  P  fruits - peaches, plums, pears, and prunes as well as anise/black licorice are recommended.)    It is not unusual to run a low-grade fever after surgery.  If your temperature is elevated above 102 , lasts longer than 24 hours, or is questionable in any way, contact our office.      Drainage from your cast/dressing is normal.  Reinforce your cast/dressing with 4x4 dressings and cover with an Ace wrap.  Wait until 24 hours after surgery to change your dressings; by this time most of your bleeding will have stopped.  If you have drainage through your dressings 2 days after surgery, contact our office.      You cast/dressing will be changed at your 2-week follow up appointment.  They should be kept clean and DRY.  If your cast/dressing is damaged before that, contact our office.      It is normal to experience some bruising in the toes after surgery and they may appear  dark  when your foot hangs down.  It is important to actively wiggle your toes for at least 5-10 minutes each hour UNLESS you had surgery on those toes.      Use ice on your foot/ankle over the dressing/cast for 20 minutes per hour, 10 or more times per day.  A large bag of frozen peas works well.      Bathing: take a tub or sponge bath instead of a  shower if possible during the first two weeks.  If you choose to shower, cover the dressing/cast with a waterproof covering, these may be ordered from www.seal-tight.com or are available at some pharmacies/medical supply stores.  Another option is XeroSox, which is the original vacuum sealed bandage and cast cover.  The cast cover has a vacuum seal and is absolutely waterproof.  5-342-105-1991 or www.xerosox.[employer] for more information.      Driving:  For surgery on the left foot/ankle, you may drive as soon as narcotic medications are stopped.  For surgery on the right foot/ankle, you may drive when you are out of a cast, off pain medications, and you feel secure with braking.      In general, listen to your foot/ankle.  If you have a sudden, dramatic increase in pain that does not resolve after an hour or so, something is wrong - call our office.  If you fall or bump your foot/ankle and have sudden pain that resolves, give it 24 hours before you call.      Many of your questions can be addressed at your 2-week follow-up appointment - please make a list of things to ask us as they come up during your recovery.      If you had a nerve block it is common to have numbness in your leg and foot for up to 30 hours after surgery.  If your leg or foot is still numb more than 30 hours after surgery, please call the office.      FUTURE DENTIST VISITS:  If you have had a total ankle arthroplasty please be advised you must be on antibiotics prior to ANY dental work.  Please call your dentist office ahead of time and make them aware of this as your dentist will be able to order the prescription.  If you have had any other surgery this is not a concern.    If you have any further questions or concerns, please call our office at [phone]    **If you have questions or concerns about your procedure,   call Dr. Sepulveda at [phone]**

## 2018-07-09 NOTE — IP AVS SNAPSHOT
[facility] Same Day Surgery    [address]    [place] [address]    Phone:  [phone]    Fax:  [phone]                                       After Visit Summary   7/9/2018    Anne Gómez    MRN: [id number]           After Visit Summary Signature Page     I have received my discharge instructions, and my questions have been answered. I have discussed any challenges I see with this plan with the nurse or doctor.    ..........................................................................................................................................  Patient/Patient Representative Signature      ..........................................................................................................................................  Patient Representative Print Name and Relationship to Patient    ..................................................               ................................................  Date                                            Time    ..........................................................................................................................................  Reviewed by Signature/Title    ...................................................              ..............................................  Date                                                            Time

## 2018-07-09 NOTE — ANESTHESIA POSTPROCEDURE EVALUATION
Patient: Anne Gómez    Procedure(s):  LEFT GREAT TOE INTERPHALANGEAL JOINT FUSION AND METATARSOPHALANGEAL INTERPOSITION GRAFT - Wound Class: I-Clean    Diagnosis:LEFT FOOT ARTHRITIS  Diagnosis Additional Information: No value filed.    Anesthesia Type:  General, LMA    Note:  Anesthesia Post Evaluation    Patient location during evaluation: PACU  Patient participation: Able to fully participate in evaluation  Level of consciousness: awake  Pain management: adequate  Airway patency: patent  Cardiovascular status: acceptable  Respiratory status: acceptable  Hydration status: acceptable  PONV: none     Anesthetic complications: None          Last vitals:  Vitals:    07/09/18 0953 07/09/18 1257 07/09/18 1300   BP: 138/74 123/71 120/69   Resp: 16 16 12   Temp: 36.9  C (98.4  F) 36  C (96.8  F)    SpO2: 99% 97% 99%         Electronically Signed By: Kourtney Calderon MD, MD  July 9, 2018  1:11 PM

## 2018-07-13 NOTE — OP NOTE
Procedure Date: 07/09/2018      PREOPERATIVE DIAGNOSES:     1.  Painful left great toe metatarsophalangeal joint fusion.     2.  DJD, left great toe IP joint.      POSTOPERATIVE DIAGNOSES:   1.  Painful left great toe metatarsophalangeal joint fusion.     2.  DJD, left great toe IP joint.      PROCEDURES:     1.  Excision of the left great toe MTP joint with interposition grafting using a semitendinosus tendon   2.  Debridement of the left great toe IP joint.      ANESTHESIA:  General.        ASSISTANT:  Edu Tijerina PA-C      PREAMBLE:  Ms. Shun Gómez presented with a painful great toe fusion as well as discomfort in her IP joint with osteophytes.  She tried conservative management to no avail.  Informed consent was obtained for the above mentioned procedure.      DESCRIPTION OF PROCEDURE:  After adequate induction of a general anesthetic, the patient was positioned supine on the operating table.  The left leg was sterilely prepped and free draped in the usual fashion.  Tourniquet around the thigh was inflated to 300 mmHg.      A longitudinal incision was made over the dorsal great toe MTP joint.  The previous fusion was exposed.  The extensor tendon was protected.      This was followed by excising about a cm of bone from the fusion.      With bone excised, a curet was used to create a recipient site for the interposition tendon on the metatarsal as well as the proximal phalanx.  The semitendinosis tendon was then rolled up and secured in the defect.  This allowed about 80 degrees dorsiflexion and 5 degrees plantar flexion of the toe.      The IP joint was then exposed.  The osteophytes were removed from the IP joint.      The tourniquet was deflated.  Hemostasis obtained.  The wounds closed in layers.  A sterile dressing and a light compressive bandage were applied.  She tolerated the procedure well and was taken to the recovery room in satisfactory condition.      She can ambulate weightbearing as  tolerated.  Sutures will be removed in 2 weeks.         JIM GUERRA MD             D: 2018   T: 2018   MT: [place]      Name:     CRISTY MONCADA   MRN:      [id number]        Account:        [id number]   :      1965           Procedure Date: 2018      Document: [id number]

## 2020-10-20 ENCOUNTER — TRANSFERRED RECORDS (OUTPATIENT)
Dept: HEALTH INFORMATION MANAGEMENT | Facility: CLINIC | Age: 55
End: 2020-10-20
Payer: COMMERCIAL

## 2021-12-21 ENCOUNTER — TRANSFERRED RECORDS (OUTPATIENT)
Dept: HEALTH INFORMATION MANAGEMENT | Facility: CLINIC | Age: 56
End: 2021-12-21
Payer: COMMERCIAL

## 2022-03-04 ENCOUNTER — MEDICAL CORRESPONDENCE (OUTPATIENT)
Dept: HEALTH INFORMATION MANAGEMENT | Facility: CLINIC | Age: 57
End: 2022-03-04
Payer: COMMERCIAL

## 2022-03-09 ENCOUNTER — TRANSCRIBE ORDERS (OUTPATIENT)
Dept: OTHER | Age: 57
End: 2022-03-09
Payer: COMMERCIAL

## 2022-03-09 DIAGNOSIS — R22.40: Primary | ICD-10-CM

## 2022-03-15 NOTE — TELEPHONE ENCOUNTER
RECORDS RECEIVED FROM: [facility]/imaging?/Dr. Awilda Kohli MD at [facility]   DATE RECEIVED: Mar 21, 2022     NOTES STATUS DETAILS   OFFICE NOTE from referring provider In process-R [facility]   MEDICATION LIST INTERNAL    MRI In process-R    XRAYS (IMAGES & REPORTS) In process-R      03/15/22   10:02 AM   FAXED REQUEST TO CARI Sarmiento CMA

## 2022-03-18 DIAGNOSIS — R22.42 KNEE MASS, LEFT: Primary | ICD-10-CM

## 2022-03-21 ENCOUNTER — OFFICE VISIT (OUTPATIENT)
Dept: ORTHOPEDICS | Facility: CLINIC | Age: 57
End: 2022-03-21
Payer: COMMERCIAL

## 2022-03-21 ENCOUNTER — PRE VISIT (OUTPATIENT)
Dept: ORTHOPEDICS | Facility: CLINIC | Age: 57
End: 2022-03-21
Payer: COMMERCIAL

## 2022-03-21 ENCOUNTER — ANCILLARY PROCEDURE (OUTPATIENT)
Dept: GENERAL RADIOLOGY | Facility: CLINIC | Age: 57
End: 2022-03-21
Attending: ORTHOPAEDIC SURGERY
Payer: COMMERCIAL

## 2022-03-21 VITALS — BODY MASS INDEX: 23.54 KG/M2 | WEIGHT: 150 LBS | HEIGHT: 67 IN

## 2022-03-21 DIAGNOSIS — R22.42 KNEE MASS, LEFT: ICD-10-CM

## 2022-03-21 DIAGNOSIS — M25.562 POSTERIOR KNEE PAIN, LEFT: Primary | ICD-10-CM

## 2022-03-21 PROCEDURE — 99203 OFFICE O/P NEW LOW 30 MIN: CPT | Performed by: PHYSICIAN ASSISTANT

## 2022-03-21 PROCEDURE — 73560 X-RAY EXAM OF KNEE 1 OR 2: CPT | Mod: LT | Performed by: RADIOLOGY

## 2022-03-21 NOTE — PROGRESS NOTES
[facility], Orthopaedic Oncology Surgery Consultation  by Dominic Garcia M.D.    Anne Gómez MRN# [id number]    YOB: 1965     Requesting physician: MD Siena Villegas Patricia A, MD Dr. Johannes Coetzee, MD            Assessment and Plan:   Assessment:  56-year-old female with history of chronic posterior left knee pain     Plan:  After examined the patient and reviewing the imaging I explained at this point I cannot explain the cause of her left posterior knee pain.  The MRI from 2020 did not demonstrate any clear mass or bony abnormality.  She does have extensive history of left foot surgery which she had popliteal blocks performed which could be a potential explanation for the patella the knee pain.  For further evaluation recommend we get a left knee MRI to evaluate if there is any new mass or abnormality from 2020.  I will send her a Fast[employer] message with results.  If they are normal I cannot fully explain pathology why she is having pain.  If there is abnormality noted she will follow-up in person for further recommendations.  The patient is in agreement with the plan.    Pedro Posadas PA-C  3/21/2022 2:22 PM  Physician Assistant   Oncology and Adult Reconstructive Surgery  Dept Orthopaedic Surgery, [facility]     Total combined visit time and work time before and after the clinic visit equals 30 minutes.      Attending MD (Dr. Dominic Garcia) Attestation :  This patient was seen and evaluated by me including a history, exam, and interpretation of all imaging and/or lab data.  Either a training physician (resident/fellow), who also saw the patient, or scribe has documented the visit in the attached note.    I explained to the patient that it is unclear to me why she is experiencing posterior knee pain.  As her last study was back in 2020, we will repeat her MRI scan to ascertain whether not there is any pathology in the posterior aspect of her knee  joint that requires any type of intervention.  Differential diagnosis would include referred pain, pain related to prior popliteal block procedures or underlying popliteal soft tissue pathology.  MRI results will be forwarded to the patient with recommendations via MD Tommie Buchanan Family Professor  Oncology and Adult Reconstructive Surgery  Dept Orthopaedic Surgery, [facility]  [phone] office, [phone] pager  [email or web address]                 History of Present Illness:   56 year old female with 4-year history of left posterior swelling and pain.  She first noticed the swelling on vacation 4 years ago he denies any history of trauma.  The knee was painful with deep knee bends so she was seen at  orthopedics by Dr. Kohli.  Steroid injections were performed which would not provide any relief.  An MRI was done of the knee in October 2020 which noted degenerative change in the patellofemoral compartment but no masses were noted in the posterior knee.  On December 1, 2021 and aspiration injection of a popliteal cyst was attempted on the left knee.  This is not successful due to the small size of popliteal cyst.  Is recommended that the patient follow-up with us for further evaluation of the swelling and pain.  Patient has history of 7 left foot surgeries which required a popliteal block.  She is not able to determine whether the pain started following these procedures patient states the pain is worse with deep knee bends feels a good nerve is being pinched.  The swelling in the back.  He does not change in size.  She has history of diffuse ligamentous laxity.  She cannot take NSAIDs secondary to kidney issues.  She denies any weakness, numbness, tingling, instability, locking, clicking, fever, or malaise       Current symptoms:  Problem: left knee pain and swelling  Onset and duration: 2 years ago   Awakens from sleep due to sx's:  No  Precipitating Injury:  No    Other joints or  "sites painful:  Yes  Fever: No  Appetite change or weight loss: No  History of prior or existing cancer: No    Background history:  DX:  1. History of left foot pain and and surgery  2. History of chronic left knee pain  3. Urgent stress incontinence  4. Neuropathy  5. Autoimmune disorder  6. Migraines   7. GERD  8. Hyperlipidemia    TREATMENTS:  1. 7/9/2018, Excision of the left great toe MTP joint with interposition grafting using a semitendinosus tendon, Dr Sepulveda  2. History of 7 bunion surgeries in the left foot from 2611-9725, performed at [facility]             Physical Exam:     EXAMINATION pertinent findings:   PSYCH: Pleasant, healthy-appearing, alert, oriented x3, cooperative. Normal mood and affect.  VITAL SIGNS: Height 1.702 m (5' 7\"), weight 68 kg (150 lb)..  Reviewed nursing intake notes.   Body mass index is 23.49 kg/m .  RESP: non labored breathing   ABD: benign, soft, non-tender, no acute peritoneal findings  SKIN: grossly normal   LYMPHATIC: grossly normal, no adenopathy, no extremity edema  NEURO: grossly normal , no motor deficits  VASCULAR: satisfactory perfusion of all extremities   MUSCULOSKELETAL:   Gait: Nonantalgic  Left knee: Minor to moderate soft tissue swelling noted in left popliteal fossa.  Pain with palpation of the posterior knee and popliteal fossa.  No erythema, skin breakdown or ecchymosis noted.  Knee range of motion from -5 to 140 degrees.  Ligamentous laxity noted with varus stress.  Negative anterior drawer sign.  Negative Connor sign. Motor intact distally TA/GSC/EHL/FHL with 5/5 strength. SILT sp/dp/tibial/saph/sural nerves. DP/PT pulses palpable, 2+, toes warm and well perfused.                    Data:   All laboratory data reviewed  All imaging studies reviewed by me    3/21/2022, 2 views left knee: No degenerative changes, fractures or bony abnormalities noted.    10/20/2020 MRI left knee: Patellar tilt noted with full-thickness fissuring of " patellofemoral compartment.  Small popliteal cyst noted    DATA for DOCUMENTATION:         Past Medical History:     Patient Active Problem List   Diagnosis     Muscle weakness (generalized)     Lack of coordination     Female stress incontinence     Past Medical History:   Diagnosis Date     Arthritis      Gastroesophageal reflux disease      Hyperlipidemia      ROBERT (minimal change disease)      Migraines      Presence of intrauterine contraceptive device      Rhinitis        Also see scanned health assessment forms.       Past Surgical History:     Past Surgical History:   Procedure Laterality Date     APPENDECTOMY       ARTHRODESIS TOE(S) Left 7/9/2018    Procedure: ARTHRODESIS TOE(S);  LEFT GREAT TOE INTERPHALANGEAL JOINT FUSION AND METATARSOPHALANGEAL INTERPOSITION GRAFT;  Surgeon: Basia Sepulveda MD;  Location: [facility]     GENITOURINARY SURGERY      WIDENING OF URETHRA     ORTHOPEDIC SURGERY Bilateral     bunionectomy     ORTHOPEDIC SURGERY      hammertoe     ORTHOPEDIC SURGERY      neuroma removal     ORTHOPEDIC SURGERY      hardware removal     SINUS SURGERY       TONSILLECTOMY              Social History:     Social History     Socioeconomic History     Marital status:      Spouse name: Not on file     Number of children: Not on file     Years of education: Not on file     Highest education level: Not on file   Occupational History     Not on file   Tobacco Use     Smoking status: Never Smoker     Smokeless tobacco: Never Used   Substance and Sexual Activity     Alcohol use: Yes     Comment: OCCAS     Drug use: No     Sexual activity: Not on file   Other Topics Concern     Not on file   Social History Narrative     Not on file     Social Determinants of Health     Financial Resource Strain: Not on file   Food Insecurity: Not on file   Transportation Needs: Not on file   Physical Activity: Not on file   Stress: Not on file   Social Connections: Not on file   Intimate Partner Violence: Not on file    Housing Stability: Not on file            Family History:     No family history on file.         Medications:     Current Outpatient Medications   Medication Sig     Acetaminophen (TYLENOL PO) Take 1,000 mg by mouth every 6 hours as needed for mild pain or fever     Ascorbic Acid (VITAMIN C PO) Take 500 mg by mouth     Atorvastatin Calcium (LIPITOR PO) Take 20 mg by mouth daily     CALCIUM PO      clindamycin (CLEOCIN T) 1 % solution Apply topically 2 times daily     Methocarbamol (ROBAXIN PO) Take 500 mg by mouth 4 times daily     tretinoin (RETIN-A) 0.025 % cream Apply topically At Bedtime     VITAMIN D, CHOLECALCIFEROL, PO Take 2,000 Units by mouth daily     baclofen (LIORESAL) 10 MG tablet Take 5-10 mg by mouth nightly as needed for muscle spasms (Patient not taking: Reported on 3/21/2022)     cetirizine (ZYRTEC) 10 MG tablet Take 10 mg by mouth daily (Patient not taking: Reported on 3/21/2022)     cycloSPORINE (RESTASIS) 0.05 % ophthalmic emulsion 2 drops daily (Patient not taking: Reported on 3/21/2022)     Frovatriptan Succinate (FROVA PO) Take 2.5 mg by mouth once as needed for migraine (Patient not taking: Reported on 3/21/2022)     HYDROcodone-acetaminophen (NORCO) 5-325 MG per tablet Take 1-2 tablets by mouth every 4 hours as needed for other (Moderate to Severe Pain) (Patient not taking: Reported on 3/21/2022)     multivitamin, therapeutic with minerals (MULTI-VITAMIN) TABS tablet Take 1 tablet by mouth daily (Patient not taking: Reported on 3/21/2022)     Omeprazole (PRILOSEC PO) Take 20 mg by mouth every morning (Patient not taking: Reported on 3/21/2022)     ondansetron (ZOFRAN-ODT) 4 MG ODT tab Take 1-2 tablets (4-8 mg) by mouth every 8 hours as needed for nausea Dissolve ON the tongue. (Patient not taking: Reported on 3/21/2022)     senna-docusate (SENOKOT-S;PERICOLACE) 8.6-50 MG per tablet Take 1-2 tablets by mouth 2 times daily Take while on oral narcotics to prevent or treat constipation.  (Patient not taking: Reported on 3/21/2022)     Sulfamethoxazole-Trimethoprim (BACTRIM PO) Take 160 mg by mouth (Patient not taking: Reported on 3/21/2022)     No current facility-administered medications for this visit.              Review of Systems:   A comprehensive 10 point review of systems (constitutional, ENT, cardiac, peripheral vascular, lymphatic, respiratory, GI, , Musculoskeletal, skin, Neurological) was performed and found to be negative except as described in this note.     See intake form completed by patient

## 2022-03-21 NOTE — LETTER
3/21/2022       RE: Anne Gómez  [address] [address] [address]     Dear Colleague,    Thank you for referring your patient, Anne Gómez, to the [facility] [place] at [facility]. Please see a copy of my visit note below.        [facility], Orthopaedic Oncology Surgery Consultation  by Dominic Garcia M.D.    Anne Gómez MRN# [id number]    YOB: 1965     Requesting physician: MD Siena Villegas Patricia A, MD Dr. Johannes Coetzee, MD            Assessment and Plan:   Assessment:  56-year-old female with history of chronic posterior left knee pain     Plan:  After examined the patient and reviewing the imaging I explained at this point I cannot explain the cause of her left posterior knee pain.  The MRI from 2020 did not demonstrate any clear mass or bony abnormality.  She does have extensive history of left foot surgery which she had popliteal blocks performed which could be a potential explanation for the patella the knee pain.  For further evaluation recommend we get a left knee MRI to evaluate if there is any new mass or abnormality from 2020.  I will send her a [email or web address] message with results.  If they are normal I cannot fully explain pathology why she is having pain.  If there is abnormality noted she will follow-up in person for further recommendations.  The patient is in agreement with the plan.    Pedro Posadas PA-C  3/21/2022 2:22 PM  Physician Assistant   Oncology and Adult Reconstructive Surgery  Dept Orthopaedic Surgery, [facility]     Total combined visit time and work time before and after the clinic visit equals 30 minutes.      Attending MD (Dr. Dominic Garcia) Attestation :  This patient was seen and evaluated by me including a history, exam, and interpretation of all imaging and/or lab data.  Either a training physician  (resident/fellow), who also saw the patient, or scribe has documented the visit in the attached note.    I explained to the patient that it is unclear to me why she is experiencing posterior knee pain.  As her last study was back in 2020, we will repeat her MRI scan to ascertain whether not there is any pathology in the posterior aspect of her knee joint that requires any type of intervention.  Differential diagnosis would include referred pain, pain related to prior popliteal block procedures or underlying popliteal soft tissue pathology.  MRI results will be forwarded to the patient with recommendations via MD Tommie Buchanan Family Professor  Oncology and Adult Reconstructive Surgery  Dept Orthopaedic Surgery, [facility]  [phone] office, [phone] pager  [email or web address]                 History of Present Illness:   56 year old female with 4-year history of left posterior swelling and pain.  She first noticed the swelling on vacation 4 years ago he denies any history of trauma.  The knee was painful with deep knee bends so she was seen at  orthopedics by Dr. Kohli.  Steroid injections were performed which would not provide any relief.  An MRI was done of the knee in October 2020 which noted degenerative change in the patellofemoral compartment but no masses were noted in the posterior knee.  On December 1, 2021 and aspiration injection of a popliteal cyst was attempted on the left knee.  This is not successful due to the small size of popliteal cyst.  Is recommended that the patient follow-up with us for further evaluation of the swelling and pain.  Patient has history of 7 left foot surgeries which required a popliteal block.  She is not able to determine whether the pain started following these procedures patient states the pain is worse with deep knee bends feels a good nerve is being pinched.  The swelling in the back.  He does not change in size.  She has history of  "diffuse ligamentous laxity.  She cannot take NSAIDs secondary to kidney issues.  She denies any weakness, numbness, tingling, instability, locking, clicking, fever, or malaise       Current symptoms:  Problem: left knee pain and swelling  Onset and duration: 2 years ago   Awakens from sleep due to sx's:  No  Precipitating Injury:  No    Other joints or sites painful:  Yes  Fever: No  Appetite change or weight loss: No  History of prior or existing cancer: No    Background history:  DX:  1. History of left foot pain and and surgery  2. History of chronic left knee pain  3. Urgent stress incontinence  4. Neuropathy  5. Autoimmune disorder  6. Migraines   7. GERD  8. Hyperlipidemia    TREATMENTS:  1. 7/9/2018, Excision of the left great toe MTP joint with interposition grafting using a semitendinosus tendon, Dr Sepulveda  2. History of 7 bunion surgeries in the left foot from 6671-8172, performed at [facility] and [facility]             Physical Exam:     EXAMINATION pertinent findings:   PSYCH: Pleasant, healthy-appearing, alert, oriented x3, cooperative. Normal mood and affect.  VITAL SIGNS: Height 1.702 m (5' 7\"), weight 68 kg (150 lb)..  Reviewed nursing intake notes.   Body mass index is 23.49 kg/m .  RESP: non labored breathing   ABD: benign, soft, non-tender, no acute peritoneal findings  SKIN: grossly normal   LYMPHATIC: grossly normal, no adenopathy, no extremity edema  NEURO: grossly normal , no motor deficits  VASCULAR: satisfactory perfusion of all extremities   MUSCULOSKELETAL:   Gait: Nonantalgic  Left knee: Minor to moderate soft tissue swelling noted in left popliteal fossa.  Pain with palpation of the posterior knee and popliteal fossa.  No erythema, skin breakdown or ecchymosis noted.  Knee range of motion from -5 to 140 degrees.  Ligamentous laxity noted with varus stress.  Negative anterior drawer sign.  Negative Connor sign. Motor intact distally TA/GSC/EHL/FHL with 5/5 strength. SILT " sp/dp/tibial/saph/sural nerves. DP/PT pulses palpable, 2+, toes warm and well perfused.                    Data:   All laboratory data reviewed  All imaging studies reviewed by me    3/21/2022, 2 views left knee: No degenerative changes, fractures or bony abnormalities noted.    10/20/2020 MRI left knee: Patellar tilt noted with full-thickness fissuring of patellofemoral compartment.  Small popliteal cyst noted    DATA for DOCUMENTATION:         Past Medical History:     Patient Active Problem List   Diagnosis     Muscle weakness (generalized)     Lack of coordination     Female stress incontinence     Past Medical History:   Diagnosis Date     Arthritis      Gastroesophageal reflux disease      Hyperlipidemia      ROBERT (minimal change disease)      Migraines      Presence of intrauterine contraceptive device      Rhinitis        Also see scanned health assessment forms.       Past Surgical History:     Past Surgical History:   Procedure Laterality Date     APPENDECTOMY       ARTHRODESIS TOE(S) Left 7/9/2018    Procedure: ARTHRODESIS TOE(S);  LEFT GREAT TOE INTERPHALANGEAL JOINT FUSION AND METATARSOPHALANGEAL INTERPOSITION GRAFT;  Surgeon: Basia Sepulveda MD;  Location: [facility]     GENITOURINARY SURGERY      WIDENING OF URETHRA     ORTHOPEDIC SURGERY Bilateral     bunionectomy     ORTHOPEDIC SURGERY      hammertoe     ORTHOPEDIC SURGERY      neuroma removal     ORTHOPEDIC SURGERY      hardware removal     SINUS SURGERY       TONSILLECTOMY              Social History:     Social History     Socioeconomic History     Marital status:      Spouse name: Not on file     Number of children: Not on file     Years of education: Not on file     Highest education level: Not on file   Occupational History     Not on file   Tobacco Use     Smoking status: Never Smoker     Smokeless tobacco: Never Used   Substance and Sexual Activity     Alcohol use: Yes     Comment: OCCAS     Drug use: No     Sexual activity: Not on  file   Other Topics Concern     Not on file   Social History Narrative     Not on file     Social Determinants of Health     Financial Resource Strain: Not on file   Food Insecurity: Not on file   Transportation Needs: Not on file   Physical Activity: Not on file   Stress: Not on file   Social Connections: Not on file   Intimate Partner Violence: Not on file   Housing Stability: Not on file            Family History:     No family history on file.         Medications:     Current Outpatient Medications   Medication Sig     Acetaminophen (TYLENOL PO) Take 1,000 mg by mouth every 6 hours as needed for mild pain or fever     Ascorbic Acid (VITAMIN C PO) Take 500 mg by mouth     Atorvastatin Calcium (LIPITOR PO) Take 20 mg by mouth daily     CALCIUM PO      clindamycin (CLEOCIN T) 1 % solution Apply topically 2 times daily     Methocarbamol (ROBAXIN PO) Take 500 mg by mouth 4 times daily     tretinoin (RETIN-A) 0.025 % cream Apply topically At Bedtime     VITAMIN D, CHOLECALCIFEROL, PO Take 2,000 Units by mouth daily     baclofen (LIORESAL) 10 MG tablet Take 5-10 mg by mouth nightly as needed for muscle spasms (Patient not taking: Reported on 3/21/2022)     cetirizine (ZYRTEC) 10 MG tablet Take 10 mg by mouth daily (Patient not taking: Reported on 3/21/2022)     cycloSPORINE (RESTASIS) 0.05 % ophthalmic emulsion 2 drops daily (Patient not taking: Reported on 3/21/2022)     Frovatriptan Succinate (FROVA PO) Take 2.5 mg by mouth once as needed for migraine (Patient not taking: Reported on 3/21/2022)     HYDROcodone-acetaminophen (NORCO) 5-325 MG per tablet Take 1-2 tablets by mouth every 4 hours as needed for other (Moderate to Severe Pain) (Patient not taking: Reported on 3/21/2022)     multivitamin, therapeutic with minerals (MULTI-VITAMIN) TABS tablet Take 1 tablet by mouth daily (Patient not taking: Reported on 3/21/2022)     Omeprazole (PRILOSEC PO) Take 20 mg by mouth every morning (Patient not taking: Reported on  3/21/2022)     ondansetron (ZOFRAN-ODT) 4 MG ODT tab Take 1-2 tablets (4-8 mg) by mouth every 8 hours as needed for nausea Dissolve ON the tongue. (Patient not taking: Reported on 3/21/2022)     senna-docusate (SENOKOT-S;PERICOLACE) 8.6-50 MG per tablet Take 1-2 tablets by mouth 2 times daily Take while on oral narcotics to prevent or treat constipation. (Patient not taking: Reported on 3/21/2022)     Sulfamethoxazole-Trimethoprim (BACTRIM PO) Take 160 mg by mouth (Patient not taking: Reported on 3/21/2022)     No current facility-administered medications for this visit.              Review of Systems:   A comprehensive 10 point review of systems (constitutional, ENT, cardiac, peripheral vascular, lymphatic, respiratory, GI, , Musculoskeletal, skin, Neurological) was performed and found to be negative except as described in this note.     See intake form completed by patient        Again, thank you for allowing me to participate in the care of your patient.      Sincerely,    Dominic Garcia MD

## 2022-03-21 NOTE — LETTER
3/21/2022         RE: Anne Gómez  [address] [facility] [address]        Dear Colleague,    Thank you for referring your patient, Anne Gómez, to the [facility] [place]. Please see a copy of my visit note below.        [facility], Orthopaedic Oncology Surgery Consultation  by Dominic Garcia M.D.    Anne Gómez MRN# [id number]    YOB: 1965     Requesting physician: MD Siena Villegas Patricia A, MD Dr. Johannes Coetzee, MD            Assessment and Plan:   Assessment:  56-year-old female with history of chronic posterior left knee pain     Plan:  After examined the patient and reviewing the imaging I explained at this point I cannot explain the cause of her left posterior knee pain.  The MRI from 2020 did not demonstrate any clear mass or bony abnormality.  She does have extensive history of left foot surgery which she had popliteal blocks performed which could be a potential explanation for the patella the knee pain.  For further evaluation recommend we get a left knee MRI to evaluate if there is any new mass or abnormality from 2020.  I will send her a Web[employer] message with results.  If they are normal I cannot fully explain pathology why she is having pain.  If there is abnormality noted she will follow-up in person for further recommendations.  The patient is in agreement with the plan.    Pedro Posadas PA-C  3/21/2022 2:22 PM  Physician Assistant   Oncology and Adult Reconstructive Surgery  Dept Orthopaedic Surgery, [facility]     Total combined visit time and work time before and after the clinic visit equals 30 minutes.      Attending MD (Dr. Dominic Garcia) Attestation :  This patient was seen and evaluated by me including a history, exam, and interpretation of all imaging and/or lab data.  Either a training physician (resident/fellow), who also saw the patient, or scribe has  documented the visit in the attached note.    I explained to the patient that it is unclear to me why she is experiencing posterior knee pain.  As her last study was back in 2020, we will repeat her MRI scan to ascertain whether not there is any pathology in the posterior aspect of her knee joint that requires any type of intervention.  Differential diagnosis would include referred pain, pain related to prior popliteal block procedures or underlying popliteal soft tissue pathology.  MRI results will be forwarded to the patient with recommendations via Jhony Garcia MD  [facility] Family Professor  Oncology and Adult Reconstructive Surgery  Dept Orthopaedic Surgery, [facility]  [phone] office, [phone] pager  [email or web address]                 History of Present Illness:   56 year old female with 4-year history of left posterior swelling and pain.  She first noticed the swelling on vacation 4 years ago he denies any history of trauma.  The knee was painful with deep knee bends so she was seen at  orthopedics by Dr. Kohli.  Steroid injections were performed which would not provide any relief.  An MRI was done of the knee in October 2020 which noted degenerative change in the patellofemoral compartment but no masses were noted in the posterior knee.  On December 1, 2021 and aspiration injection of a popliteal cyst was attempted on the left knee.  This is not successful due to the small size of popliteal cyst.  Is recommended that the patient follow-up with us for further evaluation of the swelling and pain.  Patient has history of 7 left foot surgeries which required a popliteal block.  She is not able to determine whether the pain started following these procedures patient states the pain is worse with deep knee bends feels a good nerve is being pinched.  The swelling in the back.  He does not change in size.  She has history of diffuse ligamentous laxity.  She cannot take NSAIDs secondary  "to kidney issues.  She denies any weakness, numbness, tingling, instability, locking, clicking, fever, or malaise       Current symptoms:  Problem: left knee pain and swelling  Onset and duration: 2 years ago   Awakens from sleep due to sx's:  No  Precipitating Injury:  No    Other joints or sites painful:  Yes  Fever: No  Appetite change or weight loss: No  History of prior or existing cancer: No    Background history:  DX:  1. History of left foot pain and and surgery  2. History of chronic left knee pain  3. Urgent stress incontinence  4. Neuropathy  5. Autoimmune disorder  6. Migraines   7. GERD  8. Hyperlipidemia    TREATMENTS:  1. 7/9/2018, Excision of the left great toe MTP joint with interposition grafting using a semitendinosus tendon, Dr Sepulveda  2. History of 7 bunion surgeries in the left foot from 8069-6585, performed at [facility] and [facility]             Physical Exam:     EXAMINATION pertinent findings:   PSYCH: Pleasant, healthy-appearing, alert, oriented x3, cooperative. Normal mood and affect.  VITAL SIGNS: Height 1.702 m (5' 7\"), weight 68 kg (150 lb)..  Reviewed nursing intake notes.   Body mass index is 23.49 kg/m .  RESP: non labored breathing   ABD: benign, soft, non-tender, no acute peritoneal findings  SKIN: grossly normal   LYMPHATIC: grossly normal, no adenopathy, no extremity edema  NEURO: grossly normal , no motor deficits  VASCULAR: satisfactory perfusion of all extremities   MUSCULOSKELETAL:   Gait: Nonantalgic  Left knee: Minor to moderate soft tissue swelling noted in left popliteal fossa.  Pain with palpation of the posterior knee and popliteal fossa.  No erythema, skin breakdown or ecchymosis noted.  Knee range of motion from -5 to 140 degrees.  Ligamentous laxity noted with varus stress.  Negative anterior drawer sign.  Negative Connor sign. Motor intact distally TA/GSC/EHL/FHL with 5/5 strength. SILT sp/dp/tibial/saph/sural nerves. DP/PT pulses palpable, 2+, toes warm and well " perfused.                    Data:   All laboratory data reviewed  All imaging studies reviewed by me    3/21/2022, 2 views left knee: No degenerative changes, fractures or bony abnormalities noted.    10/20/2020 MRI left knee: Patellar tilt noted with full-thickness fissuring of patellofemoral compartment.  Small popliteal cyst noted    DATA for DOCUMENTATION:         Past Medical History:     Patient Active Problem List   Diagnosis     Muscle weakness (generalized)     Lack of coordination     Female stress incontinence     Past Medical History:   Diagnosis Date     Arthritis      Gastroesophageal reflux disease      Hyperlipidemia      ROBERT (minimal change disease)      Migraines      Presence of intrauterine contraceptive device      Rhinitis        Also see scanned health assessment forms.       Past Surgical History:     Past Surgical History:   Procedure Laterality Date     APPENDECTOMY       ARTHRODESIS TOE(S) Left 7/9/2018    Procedure: ARTHRODESIS TOE(S);  LEFT GREAT TOE INTERPHALANGEAL JOINT FUSION AND METATARSOPHALANGEAL INTERPOSITION GRAFT;  Surgeon: Basia Sepulveda MD;  Location: [facility]     GENITOURINARY SURGERY      WIDENING OF URETHRA     ORTHOPEDIC SURGERY Bilateral     bunionectomy     ORTHOPEDIC SURGERY      hammertoe     ORTHOPEDIC SURGERY      neuroma removal     ORTHOPEDIC SURGERY      hardware removal     SINUS SURGERY       TONSILLECTOMY              Social History:     Social History     Socioeconomic History     Marital status:      Spouse name: Not on file     Number of children: Not on file     Years of education: Not on file     Highest education level: Not on file   Occupational History     Not on file   Tobacco Use     Smoking status: Never Smoker     Smokeless tobacco: Never Used   Substance and Sexual Activity     Alcohol use: Yes     Comment: OCCAS     Drug use: No     Sexual activity: Not on file   Other Topics Concern     Not on file   Social History Narrative     Not on  file     Social Determinants of Health     Financial Resource Strain: Not on file   Food Insecurity: Not on file   Transportation Needs: Not on file   Physical Activity: Not on file   Stress: Not on file   Social Connections: Not on file   Intimate Partner Violence: Not on file   Housing Stability: Not on file            Family History:     No family history on file.         Medications:     Current Outpatient Medications   Medication Sig     Acetaminophen (TYLENOL PO) Take 1,000 mg by mouth every 6 hours as needed for mild pain or fever     Ascorbic Acid (VITAMIN C PO) Take 500 mg by mouth     Atorvastatin Calcium (LIPITOR PO) Take 20 mg by mouth daily     CALCIUM PO      clindamycin (CLEOCIN T) 1 % solution Apply topically 2 times daily     Methocarbamol (ROBAXIN PO) Take 500 mg by mouth 4 times daily     tretinoin (RETIN-A) 0.025 % cream Apply topically At Bedtime     VITAMIN D, CHOLECALCIFEROL, PO Take 2,000 Units by mouth daily     baclofen (LIORESAL) 10 MG tablet Take 5-10 mg by mouth nightly as needed for muscle spasms (Patient not taking: Reported on 3/21/2022)     cetirizine (ZYRTEC) 10 MG tablet Take 10 mg by mouth daily (Patient not taking: Reported on 3/21/2022)     cycloSPORINE (RESTASIS) 0.05 % ophthalmic emulsion 2 drops daily (Patient not taking: Reported on 3/21/2022)     Frovatriptan Succinate (FROVA PO) Take 2.5 mg by mouth once as needed for migraine (Patient not taking: Reported on 3/21/2022)     HYDROcodone-acetaminophen (NORCO) 5-325 MG per tablet Take 1-2 tablets by mouth every 4 hours as needed for other (Moderate to Severe Pain) (Patient not taking: Reported on 3/21/2022)     multivitamin, therapeutic with minerals (MULTI-VITAMIN) TABS tablet Take 1 tablet by mouth daily (Patient not taking: Reported on 3/21/2022)     Omeprazole (PRILOSEC PO) Take 20 mg by mouth every morning (Patient not taking: Reported on 3/21/2022)     ondansetron (ZOFRAN-ODT) 4 MG ODT tab Take 1-2 tablets (4-8 mg) by  mouth every 8 hours as needed for nausea Dissolve ON the tongue. (Patient not taking: Reported on 3/21/2022)     senna-docusate (SENOKOT-S;PERICOLACE) 8.6-50 MG per tablet Take 1-2 tablets by mouth 2 times daily Take while on oral narcotics to prevent or treat constipation. (Patient not taking: Reported on 3/21/2022)     Sulfamethoxazole-Trimethoprim (BACTRIM PO) Take 160 mg by mouth (Patient not taking: Reported on 3/21/2022)     No current facility-administered medications for this visit.              Review of Systems:   A comprehensive 10 point review of systems (constitutional, ENT, cardiac, peripheral vascular, lymphatic, respiratory, GI, , Musculoskeletal, skin, Neurological) was performed and found to be negative except as described in this note.     See intake form completed by patient

## 2022-04-14 ENCOUNTER — ANCILLARY PROCEDURE (OUTPATIENT)
Dept: MRI IMAGING | Facility: CLINIC | Age: 57
End: 2022-04-14
Attending: ORTHOPAEDIC SURGERY
Payer: COMMERCIAL

## 2022-04-14 PROCEDURE — 73723 MRI JOINT LWR EXTR W/O&W/DYE: CPT | Mod: LT | Performed by: RADIOLOGY

## 2022-04-14 PROCEDURE — A9585 GADOBUTROL INJECTION: HCPCS | Performed by: RADIOLOGY

## 2022-04-14 RX ORDER — GADOBUTROL 604.72 MG/ML
7.5 INJECTION INTRAVENOUS ONCE
Status: COMPLETED | OUTPATIENT
Start: 2022-04-14 | End: 2022-04-14

## 2022-04-14 RX ADMIN — GADOBUTROL 6.5 ML: 604.72 INJECTION INTRAVENOUS at 08:42

## 2022-04-14 NOTE — DISCHARGE INSTRUCTIONS
MRI Contrast Discharge Instructions    The IV contrast you received today will pass out of your body in your  urine. This will happen in the next 24 hours. You will not feel this process.  Your urine will not change color.    Drink at least 4 extra glasses of water or juice today (unless your doctor  has restricted your fluids). This reduces the stress on your kidneys.  You may take your regular medicines.    If you are on dialysis: It is best to have dialysis today.    If you have a reaction: Most reactions happen right away. If you have  any new symptoms after leaving the hospital (such as hives or swelling),  call your hospital at the correct number below. Or call your family doctor.  If you have breathing distress or wheezing, call 911.    Special instructions: ***    I have read and understand the above information.    Signature:______________________________________ Date:___________    Staff:__________________________________________ Date:___________     Time:__________    [facility] Departments:    ___[place]: [phone]  ___[facility]: [phone]  ___[place]: [phone] ___[facility]: [phone]  ___[facility]: [phone]  ___[facility]: [phone]  ___[phone]  ___[facility]: [phone]  ___[phone]

## 2022-05-02 ENCOUNTER — TELEPHONE (OUTPATIENT)
Dept: ORTHOPEDICS | Facility: CLINIC | Age: 57
End: 2022-05-02
Payer: COMMERCIAL

## 2022-05-03 ENCOUNTER — TELEPHONE (OUTPATIENT)
Dept: ORTHOPEDICS | Facility: CLINIC | Age: 57
End: 2022-05-03
Payer: COMMERCIAL

## 2022-05-03 NOTE — TELEPHONE ENCOUNTER
===View-only below this line===  ----- Message -----  From: Dominic Garcia MD  Sent: 5/3/2022   5:52 PM CDT  To: Pedro Posadas PA-C, Melonie Espinoza, *    Yasmin,  I cannot send this pt a [employer] message so please call her and tell her that the MRI of her knee is normal.  There is no tumor or mass.  I'm sorry as I do not know the cause of her posterior knee pain.  If she wishes to pursue this further, consulting a non-orthopaedic specialist, such as a neurologist might be the next step to take.    Thank you.      ----- Message -----  From: Melonie Espinoza  Sent: 5/2/2022   1:04 PM CDT  To: Pedro Posadas PA-C, Dominic Garcia MD, *    Patient had MRI. See your plan below. Please advise.        I will send her a [employer] message with results.  If they are normal I cannot fully explain pathology why she is having pain.  If there is abnormality noted she will follow-up in person for further recommendations.  The patient is in agreement with the plan.

## (undated) DEVICE — GLOVE PROTEXIS W/NEU-THERA 8.5  2D73TE85

## (undated) DEVICE — DRSG ABDOMINAL 07 1/2X8" 7197D

## (undated) DEVICE — SOL NACL 0.9% IRRIG 1000ML BOTTLE 07138-09

## (undated) DEVICE — SU VICRYL 1 CT-1 27" UND J261H

## (undated) DEVICE — IMM LIMB ELEVATOR DC40-0203

## (undated) DEVICE — CAST PADDING 4" STERILE 9044S

## (undated) DEVICE — BNDG ELASTIC 4"X5YDS UNSTERILE 6611-40

## (undated) DEVICE — GOWN XXLG REINFORCED 9071EL

## (undated) DEVICE — GLOVE PROTEXIS MICRO 8.5  2D73PM85

## (undated) DEVICE — SUCTION CANISTER MEDIVAC LINER 3000ML W/LID 65651-530

## (undated) DEVICE — GLOVE PROTEXIS W/NEU-THERA 8.0  2D73TE80

## (undated) DEVICE — LINEN TOWEL PACK X5 5464

## (undated) DEVICE — BLADE KNIFE SURG 15 371115

## (undated) DEVICE — PACK EXTREMITY SOP15EXFSD

## (undated) DEVICE — BLADE SAW OSCILLATING STRYK MED 9.0X25X0.38MM 2296-003-111

## (undated) DEVICE — GLOVE PROTEXIS W/NEU-THERA 7.5  2D73TE75

## (undated) DEVICE — DRILL BIT SYN QUICK COUPLING 3.5X110MM 310.35

## (undated) DEVICE — PREP DURAPREP 26ML APL 8630

## (undated) DEVICE — SOL WATER IRRIG 1000ML BOTTLE 2F7114

## (undated) RX ORDER — PROPOFOL 10 MG/ML
INJECTION, EMULSION INTRAVENOUS
Status: DISPENSED
Start: 2018-07-09

## (undated) RX ORDER — HYDROCODONE BITARTRATE AND ACETAMINOPHEN 5; 325 MG/1; MG/1
TABLET ORAL
Status: DISPENSED
Start: 2018-07-09

## (undated) RX ORDER — CEFAZOLIN SODIUM 2 G/100ML
INJECTION, SOLUTION INTRAVENOUS
Status: DISPENSED
Start: 2018-07-09

## (undated) RX ORDER — FENTANYL CITRATE 50 UG/ML
INJECTION, SOLUTION INTRAMUSCULAR; INTRAVENOUS
Status: DISPENSED
Start: 2018-07-09